# Patient Record
Sex: FEMALE | Race: BLACK OR AFRICAN AMERICAN | NOT HISPANIC OR LATINO | URBAN - METROPOLITAN AREA
[De-identification: names, ages, dates, MRNs, and addresses within clinical notes are randomized per-mention and may not be internally consistent; named-entity substitution may affect disease eponyms.]

---

## 2024-03-14 ENCOUNTER — EMERGENCY (EMERGENCY)
Facility: HOSPITAL | Age: 49
LOS: 1 days | Discharge: HOME | End: 2024-03-14
Attending: EMERGENCY MEDICINE | Admitting: EMERGENCY MEDICINE
Payer: COMMERCIAL

## 2024-03-14 VITALS
RESPIRATION RATE: 18 BRPM | SYSTOLIC BLOOD PRESSURE: 154 MMHG | TEMPERATURE: 98 F | DIASTOLIC BLOOD PRESSURE: 97 MMHG | OXYGEN SATURATION: 100 % | HEART RATE: 69 BPM

## 2024-03-14 DIAGNOSIS — Z98.89 OTHER SPECIFIED POSTPROCEDURAL STATES: Chronic | ICD-10-CM

## 2024-03-14 DIAGNOSIS — O00.9 ECTOPIC PREGNANCY, UNSPECIFIED: Chronic | ICD-10-CM

## 2024-03-14 PROCEDURE — 99284 EMERGENCY DEPT VISIT MOD MDM: CPT

## 2024-03-14 RX ORDER — VALACYCLOVIR 500 MG/1
1 TABLET, FILM COATED ORAL
Qty: 21 | Refills: 0
Start: 2024-03-14 | End: 2024-03-20

## 2024-03-14 NOTE — ED PROVIDER NOTE - OBJECTIVE STATEMENT
48-year-old female with past medical history of asthma, states recently diagnosed with hypertension, presents with right facial paralysis.  States it started 2 days ago went to urgent care yesterday.  States they told her no strokelike symptoms and can go home.  States did her own research and given family history of Bell's palsy think she has Bell's palsy.  Denies any focal weakness.  Denies gait instability.  Does state has blurry vision.  Patient has steroids at home and took prednisone 40 mg for the last 2 days.

## 2024-03-14 NOTE — ED PROVIDER NOTE - NSFOLLOWUPINSTRUCTIONS_ED_ALL_ED_FT
Take valayclovir 1000mg (1 gram) three times a day for a week  take prednisone 60mg (which you have already) for a total of 7 days (take for 5 more days as you have taken for 2 days already)    Bethea Palsy, Adult       Bell palsy is a short-term inability to move muscles in part of the face. The inability to move (paralysis) results from inflammation or compression of the facial nerve, which travels along the skull and under the ear to the side of the face (7th cranial nerve). This nerve is responsible for facial movements that include blinking, closing the eyes, smiling, and frowning.    What are the causes?  The exact cause of this condition is not known. It may be caused by an infection from a virus, such as the chickenpox (herpes zoster), Kisha-Barr, or mumps virus.    What increases the risk?  You are more likely to develop this condition if:    You are pregnant.  You have diabetes.  You have had a recent infection in your nose, throat, or airways (upper respiratory infection).  You have a weakened body defense system (immune system).  You have had a facial injury, such as a fracture.  You have a family history of Bell palsy.    What are the signs or symptoms?  Symptoms of this condition include:    Weakness on one side of the face.  Drooping eyelid and corner of the mouth.  Excessive tearing in one eye.  Difficulty closing the eyelid.  Dry eye.  Drooling.  Dry mouth.  Changes in taste.  Change in facial appearance.  Pain behind one ear.  Ringing in one or both ears.  Sensitivity to sound in one ear.  Facial twitching.  Headache.  Impaired speech.  Dizziness.  Difficulty eating or drinking.    Most of the time, only one side of the face is affected. Rarely, Bell palsy affects the whole face.    How is this diagnosed?  This condition is diagnosed based on:    Your symptoms.  Your medical history.  A physical exam.    You may also have to see health care providers who specialize in disorders of the nerves (neurologist) or diseases and conditions of the eye (ophthalmologist). You may have tests, such as:    A test to check for nerve damage (electromyogram).  Imaging studies, such as CT or MRI scans.  Blood tests.    How is this treated?  This condition affects every person differently. Sometimes symptoms go away without treatment within a couple weeks. If treatment is needed, it varies from person to person. The goal of treatment is to reduce inflammation and protect the eye from damage. Treatment for Bell palsy may include:    Medicines, such as:    Steroids to reduce swelling and inflammation.  Antiviral drugs.  Pain relievers, including aspirin, acetaminophen, or ibuprofen.  Eye drops or ointment to keep your eye moist.  Eye protection, if you cannot close your eye.  Exercises or massage to regain muscle strength and function (physical therapy).    Follow these instructions at home:     Take over-the-counter and prescription medicines only as told by your health care provider.  If your eye is affected:    Keep your eye moist with eye drops or ointment as told by your health care provider.  Follow instructions for eye care and protection as told by your health care provider.  Do any physical therapy exercises as told by your health care provider.  Keep all follow-up visits as told by your health care provider. This is important.    Contact a health care provider if:  You have a fever.  Your symptoms do not get better within 2–3 weeks, or your symptoms get worse.  Your eye is red, irritated, or painful.  You have new symptoms.    Get help right away if:  You have weakness or numbness in a part of your body other than your face.  You have trouble swallowing.  You develop neck pain or stiffness.  You develop dizziness or shortness of breath.    Summary  Bell palsy is a short-term inability to move muscles in part of the face. The inability to move (paralysis) results from inflammation or compression of the facial nerve.  This condition affects every person differently. Sometimes symptoms go away without treatment within a couple weeks.  If treatment is needed, it varies from person to person. The goal of treatment is to reduce inflammation and protect the eye from damage.  Contact your health care provider if your symptoms do not get better within 2–3 weeks, or your symptoms get worse.    ADDITIONAL NOTES AND INSTRUCTIONS    Please follow up with your Primary MD in 24-48 hr.  Seek immediate medical care for any new/worsening signs or symptoms.

## 2024-03-14 NOTE — ED PROVIDER NOTE - PHYSICAL EXAMINATION
Physical exam  Well-appearing female in no respiratory distress  Vital signs stable  Right facial nerve palsy forehead included  Neuro cranial nerves other than cranial nerve VII intact, 5 x 5 motor upper and lower extremity, sensation intact  Clear to auscultation bilaterally  S1-S2 no murmurs rubs or gallops  Eyes 20/15 OS 20/15 OD

## 2024-03-14 NOTE — ED PROVIDER NOTE - PATIENT PORTAL LINK FT
You can access the FollowMyHealth Patient Portal offered by [facility] by registering at the following website: [email or web address]. By joining [facility]’s FollowMyHealth portal, you will also be able to view your health information using other applications (apps) compatible with our system.

## 2024-03-14 NOTE — ED PROVIDER NOTE - CLINICAL SUMMARY MEDICAL DECISION MAKING FREE TEXT BOX
Impression  Bell's palsy patient has already begun steroids will send valacyclovir to patient's pharmacy  Visual acuity intact likely from not blinking patient has dry eyes and blurriness I recommended eye patch and Visine drops

## 2024-03-14 NOTE — ED PROVIDER NOTE - NSICDXFAMILYHX_GEN_ALL_CORE_FT
FAMILY HISTORY:  Father  Still living? No  Family history of hepatic cirrhosis, Age at diagnosis: Age Unknown  Hypertension, Age at diagnosis: Age Unknown    Mother  Still living? Yes, Estimated age: Age Unknown  Hypertension, Age at diagnosis: Age Unknown

## 2025-06-04 PROBLEM — Z00.00 ENCOUNTER FOR PREVENTIVE HEALTH EXAMINATION: Status: ACTIVE | Noted: 2025-06-04

## 2025-06-10 ENCOUNTER — LABORATORY RESULT (OUTPATIENT)
Age: 50
End: 2025-06-10

## 2025-06-10 ENCOUNTER — RESULT CHARGE (OUTPATIENT)
Age: 50
End: 2025-06-10

## 2025-06-10 ENCOUNTER — APPOINTMENT (OUTPATIENT)
Dept: PULMONOLOGY | Facility: CLINIC | Age: 50
End: 2025-06-10
Payer: COMMERCIAL

## 2025-06-10 VITALS
SYSTOLIC BLOOD PRESSURE: 134 MMHG | WEIGHT: 206 LBS | TEMPERATURE: 97.2 F | HEART RATE: 83 BPM | OXYGEN SATURATION: 95 % | DIASTOLIC BLOOD PRESSURE: 90 MMHG | HEIGHT: 65 IN | BODY MASS INDEX: 34.32 KG/M2

## 2025-06-10 PROBLEM — J45.40 MODERATE PERSISTENT ASTHMA, UNSPECIFIED WHETHER COMPLICATED: Status: ACTIVE | Noted: 2025-06-10

## 2025-06-10 PROCEDURE — 95012 NITRIC OXIDE EXP GAS DETER: CPT

## 2025-06-10 PROCEDURE — 94060 EVALUATION OF WHEEZING: CPT

## 2025-06-10 PROCEDURE — 94726 PLETHYSMOGRAPHY LUNG VOLUMES: CPT

## 2025-06-10 PROCEDURE — 94729 DIFFUSING CAPACITY: CPT

## 2025-06-10 PROCEDURE — 99204 OFFICE O/P NEW MOD 45 MIN: CPT | Mod: 25

## 2025-06-10 PROCEDURE — ZZZZZ: CPT

## 2025-06-11 LAB
A1AT SERPL-MCNC: 137 MG/DL
ALBUMIN SERPL ELPH-MCNC: 4.6 G/DL
ALP BLD-CCNC: 93 U/L
ALT SERPL-CCNC: 28 U/L
ANION GAP SERPL CALC-SCNC: 16 MMOL/L
AST SERPL-CCNC: 13 U/L
BASOPHILS # BLD AUTO: 0.04 K/UL
BASOPHILS NFR BLD AUTO: 0.4 %
BILIRUB SERPL-MCNC: 0.3 MG/DL
BUN SERPL-MCNC: 13 MG/DL
CALCIUM SERPL-MCNC: 9.9 MG/DL
CHLORIDE SERPL-SCNC: 101 MMOL/L
CO2 SERPL-SCNC: 26 MMOL/L
CREAT SERPL-MCNC: 0.9 MG/DL
DEPRECATED KAPPA LC FREE/LAMBDA SER: 1.86 RATIO
EGFRCR SERPLBLD CKD-EPI 2021: 77 ML/MIN/1.73M2
EOSINOPHIL # BLD AUTO: 0.04 K/UL
EOSINOPHIL # BLD MANUAL: 40 /UL
EOSINOPHIL NFR BLD AUTO: 0.4 %
ESTIMATED AVERAGE GLUCOSE: 108 MG/DL
GLUCOSE SERPL-MCNC: 81 MG/DL
HBA1C MFR BLD HPLC: 5.4 %
HCT VFR BLD CALC: 46.6 %
HGB BLD-MCNC: 15.3 G/DL
IGA SERPL-MCNC: 221 MG/DL
IGG SERPL-MCNC: 813 MG/DL
IGM SERPL-MCNC: 92 MG/DL
IMM GRANULOCYTES NFR BLD AUTO: 0.4 %
KAPPA LC CSF-MCNC: 0.99 MG/DL
KAPPA LC SERPL-MCNC: 1.84 MG/DL
LYMPHOCYTES # BLD AUTO: 3.44 K/UL
LYMPHOCYTES NFR BLD AUTO: 31.6 %
MAN DIFF?: NORMAL
MCHC RBC-ENTMCNC: 28.9 PG
MCHC RBC-ENTMCNC: 32.8 G/DL
MCV RBC AUTO: 87.9 FL
MONOCYTES # BLD AUTO: 0.67 K/UL
MONOCYTES NFR BLD AUTO: 6.2 %
NEUTROPHILS # BLD AUTO: 6.65 K/UL
NEUTROPHILS NFR BLD AUTO: 61 %
PLATELET # BLD AUTO: 198 K/UL
POTASSIUM SERPL-SCNC: 3.8 MMOL/L
PROT SERPL-MCNC: 6.9 G/DL
RBC # BLD: 5.3 M/UL
RBC # FLD: 13.1 %
SODIUM SERPL-SCNC: 143 MMOL/L
TSH SERPL-ACNC: 0.83 UIU/ML
WBC # FLD AUTO: 10.88 K/UL

## 2025-06-12 ENCOUNTER — NON-APPOINTMENT (OUTPATIENT)
Age: 50
End: 2025-06-12

## 2025-06-12 LAB
A ALTERNATA IGE QN: 4.08 KUA/L
A FUMIGATUS IGE QN: 0.13 KUA/L
BERMUDA GRASS IGE QN: <0.1 KUA/L
BOXELDER IGE QN: <0.1 KUA/L
C HERBARUM IGE QN: 0.15 KUA/L
CALIF WALNUT IGE QN: <0.1 KUA/L
CAT DANDER IGE QN: 2.55 KUA/L
CMN PIGWEED IGE QN: <0.1 KUA/L
COMMON RAGWEED IGE QN: 0.17 KUA/L
COTTONWOOD IGE QN: 0.11 KUA/L
D FARINAE IGE QN: 5.25 KUA/L
D PTERONYSS IGE QN: 3.89 KUA/L
DEPRECATED A ALTERNATA IGE RAST QL: 3
DEPRECATED A FUMIGATUS IGE RAST QL: NORMAL
DEPRECATED BERMUDA GRASS IGE RAST QL: 0
DEPRECATED BOXELDER IGE RAST QL: 0
DEPRECATED C HERBARUM IGE RAST QL: NORMAL
DEPRECATED CALIF WALNUT POLN IGE RAST QL: 0
DEPRECATED CAT DANDER IGE RAST QL: 2
DEPRECATED COMMON PIGWEED IGE RAST QL: 0
DEPRECATED COMMON RAGWEED IGE RAST QL: NORMAL
DEPRECATED COTTONWOOD IGE RAST QL: NORMAL
DEPRECATED D FARINAE IGE RAST QL: 3
DEPRECATED D PTERONYSS IGE RAST QL: 3
DEPRECATED DOG DANDER IGE RAST QL: 4
DEPRECATED GOOSEFOOT IGE RAST QL: NORMAL
DEPRECATED LONDON PLANE IGE RAST QL: 0
DEPRECATED MOUSE URINE PROT IGE RAST QL: NORMAL
DEPRECATED MUGWORT IGE RAST QL: 0
DEPRECATED P NOTATUM IGE RAST QL: NORMAL
DEPRECATED RED CEDAR IGE RAST QL: 0
DEPRECATED ROACH IGE RAST QL: NORMAL
DEPRECATED SHEEP SORREL IGE RAST QL: 0
DEPRECATED SILVER BIRCH IGE RAST QL: 3
DEPRECATED TIMOTHY IGE RAST QL: 0
DEPRECATED WHITE ASH IGE RAST QL: 0
DEPRECATED WHITE ELM IGE RAST QL: 0
DEPRECATED WHITE MULBERRY IGE RAST QL: 0
DEPRECATED WHITE OAK IGE RAST QL: 2
DOG DANDER IGE QN: 27.7 KUA/L
GOOSEFOOT IGE QN: 0.13 KUA/L
LONDON PLANE IGE QN: <0.1 KUA/L
MOUSE URINE PROT IGE QN: 0.27 KUA/L
MT JUNIPER IGE QN: <0.1 KUA/L
MUGWORT IGE QN: <0.1 KUA/L
P NOTATUM IGE QN: 0.15 KUA/L
ROACH IGE QN: 0.27 KUA/L
SHEEP SORREL IGE QN: <0.1 KUA/L
SILVER BIRCH IGE QN: 3.56 KUA/L
TIMOTHY IGE QN: <0.1 KUA/L
TOTAL IGE SMQN RAST: 536 KU/L
WHITE ASH IGE QN: <0.1 KUA/L
WHITE ELM IGE QN: <0.1 KUA/L
WHITE MULBERRY IGE QN: <0.1 KUA/L
WHITE OAK IGE QN: 0.81 KUA/L

## 2025-06-18 RX ORDER — DUPILUMAB 300 MG/2ML
300 INJECTION, SOLUTION SUBCUTANEOUS
Qty: 1 | Refills: 11 | Status: ACTIVE | COMMUNITY
Start: 2025-06-12 | End: 1900-01-01

## 2025-06-18 RX ORDER — DUPILUMAB 300 MG/2ML
300 INJECTION, SOLUTION SUBCUTANEOUS
Qty: 1 | Refills: 0 | Status: ACTIVE | COMMUNITY
Start: 2025-06-12 | End: 1900-01-01

## 2025-06-27 RX ORDER — EPINEPHRINE 0.3 MG/.3ML
0.3 INJECTION INTRAMUSCULAR
Qty: 1 | Refills: 0 | Status: ACTIVE | COMMUNITY
Start: 2025-06-27 | End: 1900-01-01

## 2025-08-29 ENCOUNTER — APPOINTMENT (OUTPATIENT)
Dept: CT IMAGING | Facility: CLINIC | Age: 50
End: 2025-08-29

## 2025-08-29 PROCEDURE — 71250 CT THORAX DX C-: CPT

## 2025-08-29 PROCEDURE — 70486 CT MAXILLOFACIAL W/O DYE: CPT

## 2025-09-12 ENCOUNTER — APPOINTMENT (OUTPATIENT)
Dept: PULMONOLOGY | Facility: CLINIC | Age: 50
End: 2025-09-12

## 2025-09-12 VITALS
HEART RATE: 72 BPM | SYSTOLIC BLOOD PRESSURE: 110 MMHG | HEIGHT: 67 IN | OXYGEN SATURATION: 98 % | BODY MASS INDEX: 31.86 KG/M2 | WEIGHT: 203 LBS | DIASTOLIC BLOOD PRESSURE: 76 MMHG

## 2025-09-12 DIAGNOSIS — R09.81 NASAL CONGESTION: ICD-10-CM

## 2025-09-12 PROCEDURE — 99214 OFFICE O/P EST MOD 30 MIN: CPT

## 2025-09-12 RX ORDER — FLUTICASONE PROPIONATE 50 UG/1
50 SPRAY NASAL TWICE DAILY
Qty: 1 | Refills: 3 | Status: ACTIVE | COMMUNITY
Start: 2025-09-12 | End: 1900-01-01

## 2025-09-12 RX ORDER — CETIRIZINE HYDROCHLORIDE 10 MG/1
10 TABLET, COATED ORAL
Qty: 30 | Refills: 5 | Status: ACTIVE | COMMUNITY
Start: 2025-09-12 | End: 1900-01-01

## 2025-09-12 RX ORDER — ALBUTEROL SULFATE 90 UG/1
108 (90 BASE) INHALANT RESPIRATORY (INHALATION)
Qty: 1 | Refills: 3 | Status: ACTIVE | COMMUNITY
Start: 2025-09-12 | End: 1900-01-01

## 2025-09-12 RX ORDER — AZELASTINE HYDROCHLORIDE 137 UG/1
137 SPRAY, METERED NASAL TWICE DAILY
Qty: 1 | Refills: 4 | Status: ACTIVE | COMMUNITY
Start: 2025-09-12 | End: 1900-01-01

## 2025-09-12 RX ORDER — BUDESONIDE AND FORMOTEROL FUMARATE DIHYDRATE 80; 4.5 UG/1; UG/1
80-4.5 AEROSOL RESPIRATORY (INHALATION) TWICE DAILY
Qty: 1 | Refills: 3 | Status: ACTIVE | COMMUNITY
Start: 2025-09-12 | End: 1900-01-01